# Patient Record
Sex: MALE | Race: BLACK OR AFRICAN AMERICAN | Employment: UNEMPLOYED | URBAN - METROPOLITAN AREA
[De-identification: names, ages, dates, MRNs, and addresses within clinical notes are randomized per-mention and may not be internally consistent; named-entity substitution may affect disease eponyms.]

---

## 2019-01-30 ENCOUNTER — OFFICE VISIT (OUTPATIENT)
Dept: FAMILY MEDICINE CLINIC | Facility: CLINIC | Age: 44
End: 2019-01-30

## 2019-01-30 VITALS
OXYGEN SATURATION: 97 % | TEMPERATURE: 98 F | HEIGHT: 69 IN | RESPIRATION RATE: 16 BRPM | SYSTOLIC BLOOD PRESSURE: 114 MMHG | HEART RATE: 70 BPM | BODY MASS INDEX: 38.06 KG/M2 | WEIGHT: 257 LBS | DIASTOLIC BLOOD PRESSURE: 73 MMHG

## 2019-01-30 DIAGNOSIS — R10.13 EPIGASTRIC DISCOMFORT: Primary | ICD-10-CM

## 2019-01-30 DIAGNOSIS — Z12.5 SCREENING FOR PROSTATE CANCER: ICD-10-CM

## 2019-01-30 DIAGNOSIS — N52.9 ERECTILE DYSFUNCTION, UNSPECIFIED ERECTILE DYSFUNCTION TYPE: ICD-10-CM

## 2019-01-30 DIAGNOSIS — K21.9 GASTROESOPHAGEAL REFLUX DISEASE, ESOPHAGITIS PRESENCE NOT SPECIFIED: ICD-10-CM

## 2019-01-30 DIAGNOSIS — R10.13 EPIGASTRIC DISCOMFORT: ICD-10-CM

## 2019-01-30 DIAGNOSIS — Z13.31 NEGATIVE DEPRESSION SCREENING: ICD-10-CM

## 2019-01-30 PROBLEM — E66.01 SEVERE OBESITY (HCC): Status: ACTIVE | Noted: 2019-01-30

## 2019-01-30 NOTE — PROGRESS NOTES
Nina Escamilla is a 44 y.o.@ presents today for office visit for establish care. Pt is in Room # 5. 
 
 1. Have you been to the ER, urgent care clinic since your last visit? Hospitalized since your last visit?n/a 2. Have you seen or consulted any other health care providers outside of the [address] [place] [address] since your last visit? Include any pap smears or colon screening. n/a 
 
  
PHQ over the last two weeks 1/30/2019 Little interest or pleasure in doing things Not at all Feeling down, depressed, irritable, or hopeless Several days Total Score PHQ 2 1 Health Maintenance reviewed - will update. . 
 
Requested Prescriptions No prescriptions requested or ordered in this encounter Visit Vitals /73 (BP 1 Location: Right arm, BP Patient Position: Sitting) Pulse 70 Temp 98 °F (36.7 °C) (Oral) Resp 16 Ht 5' 9\" (1.753 m) Wt 257 lb (116.6 kg) SpO2 97% BMI 37.95 kg/m²  
 
 
  
Upcoming Appts NO. 
  
VORB: No orders of the defined types were placed in this encounter. 
 MD Layne Parker LPN

## 2019-01-30 NOTE — PROGRESS NOTES
History: Nina Escamilla is a 44 y.o. male presenting today for an initial visit to establish care and c/o acid reflux. Medical history is otherwise unremarkable per patient. HPI: Pt reports a 2 year history of acid reflux described as upset stomach and heartburn. He was taking Prilosec with good response, but symptoms returned once he stopped. Symptoms are most prevalent in the morning on an empty stomach. Food seems to improve symptoms. He reports associated mild epigastric discomfort described as burning and non radiating. He reports one episode of small amount of brbpr 3 months ago with wiping. He denies change in appetite, difficulty swallowing, painful swallowing, vomiting. diarrhea, constipation, or melena. Pt reports a one year history of difficult with obtaining and maintaining erections sufficient for sexual intercourse. He denies decreased libido and reports having occasional morning erections. He is able to achieve No past medical history on file. No past surgical history on file. Social History Socioeconomic History  Marital status:  Spouse name: Not on file  Number of children: Not on file  Years of education: Not on file  Highest education level: Not on file Social Needs  Financial resource strain: Not on file  Food insecurity - worry: Not on file  Food insecurity - inability: Not on file  Transportation needs - medical: Not on file  Transportation needs - non-medical: Not on file Occupational History  Not on file Tobacco Use  Smoking status: Never Smoker  Smokeless tobacco: Never Used Substance and Sexual Activity  Alcohol use: Yes Alcohol/week: 2.4 oz Types: 2 Cans of beer, 2 Glasses of wine per week Comment: 14 beers per week 2 cans per day and 2 cocktails per day  Drug use: Yes Types: Marijuana Comment: pt smokes everyday  Sexual activity: Not on file Other Topics Concern   Service No  
 Blood Transfusions No  
 Caffeine Concern No  
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  
 Sleep Concern No  
 Stress Concern No  
 Weight Concern No  
 Special Diet No  
 Back Care No  
 Exercise No  
 Bike Helmet No  
 Seat Belt Not Asked  Self-Exams Not Asked Social History Narrative  Not on file Family History Problem Relation Age of Onset  Cancer Mother   
     breast,lymph nodes  Heart Attack Mother   
     angina acid reflux  Hypertension Mother  Cancer Father   
     lymph nodes  Hypertension Brother No current outpatient medications on file prior to visit. No current facility-administered medications on file prior to visit. Allergies no known allergies Review of Systems Constitutional: Negative for chills, diaphoresis, fever, malaise/fatigue and weight loss. HENT: Negative for sore throat. Eyes: Negative for blurred vision. Respiratory: Negative for cough and shortness of breath. Cardiovascular: Negative for chest pain and palpitations. Gastrointestinal: Positive for abdominal pain, heartburn and nausea. Negative for blood in stool, constipation, diarrhea, melena and vomiting. Genitourinary: Negative for dysuria, flank pain, frequency, hematuria and urgency. Musculoskeletal: Negative for joint pain and myalgias. Neurological: Negative for dizziness, weakness and headaches. Endo/Heme/Allergies: Does not bruise/bleed easily. Psychiatric/Behavioral: Negative for depression. Objective:  
VS:   
Visit Vitals /73 (BP 1 Location: Right arm, BP Patient Position: Sitting) Pulse 70 Temp 98 °F (36.7 °C) (Oral) Resp 16 Ht 5' 9\" (1.753 m) Wt 257 lb (116.6 kg) SpO2 97% BMI 37.95 kg/m² Physical Exam  
Constitutional: He is oriented to person, place, and time. He appears well-developed and well-nourished. HENT:  
Head: Normocephalic and atraumatic. Mouth/Throat: Oropharynx is clear and moist.  
Eyes: Conjunctivae and EOM are normal. Pupils are equal, round, and reactive to light. Neck: Normal range of motion. Neck supple. Cardiovascular: Normal rate, regular rhythm, normal heart sounds and intact distal pulses. Pulmonary/Chest: Effort normal and breath sounds normal.  
Abdominal: Soft. Bowel sounds are normal. He exhibits no distension and no mass. There is no tenderness. No hernia. Musculoskeletal: Normal range of motion. He exhibits no edema or deformity. Lymphadenopathy:  
  He has no cervical adenopathy. Neurological: He is alert and oriented to person, place, and time. Skin: Skin is warm and dry. Capillary refill takes less than 2 seconds. Psychiatric: He has a normal mood and affect. His behavior is normal.  
Nursing note and vitals reviewed. Assessment/ Plan:  
 
Diagnoses and all orders for this visit: 1. Epigastric discomfort 
-     CBC WITH AUTOMATED DIFF; Future -     LIPASE; Future -     AMYLASE; Future -     METABOLIC PANEL, COMPREHENSIVE; Future 
-     H. PYLORI BREATH TEST; Future 2. Gastroesophageal reflux disease, esophagitis presence not specified -     Will resume Prilosec daily for now. 3. Screening for prostate cancer -     PSA SCREENING (SCREENING); Future 4. Erectile dysfunction, unspecified erectile dysfunction type -     TESTOSTERONE, FREE & TOTAL; Future 5. Negative depression screening -     [id number] [address] I have discussed the diagnosis with the patient and the intended plan as seen in the above orders. The patient verbalized understanding and agrees with the plan. Follow-up Disposition: Not on File Ghazala Walsh MD

## 2019-02-03 LAB
% FREE TESTOSTERONE: 1.47 %
A-G RATIO,AGRAT: 2.4 RATIO (ref 1.1–2.6)
ABSOLUTE LYMPHOCYTE COUNT, 10803: 3 K/UL (ref 1–4.8)
ALBUMIN SERPL-MCNC: 4.7 G/DL (ref 3.5–5)
ALP SERPL-CCNC: 87 U/L (ref 25–115)
ALT SERPL-CCNC: 29 U/L (ref 5–40)
AMYLASE SERPL-CCNC: 41 U/L (ref 20–120)
ANION GAP SERPL CALC-SCNC: 16 MMOL/L
AST SERPL W P-5'-P-CCNC: 29 U/L (ref 10–37)
BASOPHILS # BLD: 0 K/UL (ref 0–0.2)
BASOPHILS NFR BLD: 1 % (ref 0–2)
BILIRUB SERPL-MCNC: 0.5 MG/DL (ref 0.2–1.2)
BUN SERPL-MCNC: 14 MG/DL (ref 6–22)
CALCIUM SERPL-MCNC: 9 MG/DL (ref 8.4–10.4)
CHLORIDE SERPL-SCNC: 104 MMOL/L (ref 98–110)
CO2 SERPL-SCNC: 25 MMOL/L (ref 20–32)
CREAT SERPL-MCNC: 0.9 MG/DL (ref 0.5–1.2)
EOSINOPHIL # BLD: 0.2 K/UL (ref 0–0.5)
EOSINOPHIL NFR BLD: 3 % (ref 0–6)
ERYTHROCYTE [DISTWIDTH] IN BLOOD BY AUTOMATED COUNT: 14.1 % (ref 10–15.5)
FREE TESTOSTERONE,DIRECT, 144981: 6.31 NG/DL
GFRAA, 66117: >60
GFRNA, 66118: >60
GLOBULIN,GLOB: 2 G/DL (ref 2–4)
GLUCOSE SERPL-MCNC: 85 MG/DL (ref 70–99)
GRANULOCYTES,GRANS: 49 % (ref 40–75)
HCT VFR BLD AUTO: 47 % (ref 36.6–51.9)
HGB BLD-MCNC: 14.9 G/DL (ref 13.2–17.3)
LIPASE SERPL-CCNC: 31 U/L (ref 7–60)
LYMPHOCYTES, LYMLT: 44 % (ref 20–45)
MCH RBC QN AUTO: 32 PG (ref 26–34)
MCHC RBC AUTO-ENTMCNC: 32 G/DL (ref 31–36)
MCV RBC AUTO: 99 FL (ref 80–95)
MONOCYTES # BLD: 0.3 K/UL (ref 0.1–1)
MONOCYTES NFR BLD: 4 % (ref 3–12)
NEUTROPHILS # BLD AUTO: 3.3 K/UL (ref 1.8–7.7)
PLATELET # BLD AUTO: 224 K/UL (ref 140–440)
PMV BLD AUTO: 11 FL (ref 9–13)
POTASSIUM SERPL-SCNC: 4.4 MMOL/L (ref 3.5–5.5)
PROT SERPL-MCNC: 6.7 G/DL (ref 6.4–8.3)
PSA SERPL-MCNC: 0.19 NG/ML
RBC # BLD AUTO: 4.73 M/UL (ref 3.8–5.8)
SODIUM SERPL-SCNC: 145 MMOL/L (ref 133–145)
TESTOSTERONE: 429 NG/DL
WBC # BLD AUTO: 6.8 K/UL (ref 4–11)

## 2019-02-04 ENCOUNTER — HOSPITAL ENCOUNTER (OUTPATIENT)
Dept: LAB | Age: 44
Discharge: HOME | End: 2019-02-04
Payer: SELF-PAY

## 2019-02-04 PROCEDURE — 82274 ASSAY TEST FOR BLOOD FECAL: CPT

## 2019-02-05 DIAGNOSIS — R10.13 EPIGASTRIC DISCOMFORT: Primary | ICD-10-CM

## 2019-02-06 ENCOUNTER — OFFICE VISIT (OUTPATIENT)
Dept: FAMILY MEDICINE CLINIC | Facility: CLINIC | Age: 44
End: 2019-02-06

## 2019-02-06 VITALS
HEIGHT: 69 IN | WEIGHT: 257 LBS | DIASTOLIC BLOOD PRESSURE: 72 MMHG | HEART RATE: 69 BPM | TEMPERATURE: 97.8 F | RESPIRATION RATE: 18 BRPM | OXYGEN SATURATION: 98 % | BODY MASS INDEX: 38.06 KG/M2 | SYSTOLIC BLOOD PRESSURE: 115 MMHG

## 2019-02-06 DIAGNOSIS — R10.13 EPIGASTRIC DISCOMFORT: ICD-10-CM

## 2019-02-06 DIAGNOSIS — N52.9 ERECTILE DYSFUNCTION, UNSPECIFIED ERECTILE DYSFUNCTION TYPE: ICD-10-CM

## 2019-02-06 DIAGNOSIS — K21.9 GASTROESOPHAGEAL REFLUX DISEASE, ESOPHAGITIS PRESENCE NOT SPECIFIED: Primary | ICD-10-CM

## 2019-02-06 LAB — HEMOCCULT STL QL IA: NEGATIVE

## 2019-02-06 RX ORDER — PHENOL/SODIUM PHENOLATE
20 AEROSOL, SPRAY (ML) MUCOUS MEMBRANE DAILY
Qty: 30 TAB | Refills: 1 | Status: SHIPPED | OUTPATIENT
Start: 2019-02-06

## 2019-02-06 NOTE — PROGRESS NOTES
Esteban Schneider is a 44 y.o.@ presents today for office visit for follow up. Pt is in Room # 5.     1. Have you been to the ER, urgent care clinic since your last visit? Hospitalized since your last visit? No    2. Have you seen or consulted any other health care providers outside of the [address] [place] [address] since your last visit? Include any pap smears or colon screening. No         Health Maintenance reviewed - updated.  .    Requested Prescriptions      No prescriptions requested or ordered in this encounter       Visit Vitals  /72 (BP 1 Location: Right arm, BP Patient Position: Sitting)   Pulse 69   Temp 97.8 °F (36.6 °C) (Oral)   Resp 18   Ht 5' 9\" (1.753 m)   Wt 257 lb (116.6 kg)   SpO2 98%   BMI 37.95 kg/m²          Upcoming Appts  No.     VORB: No orders of the defined types were placed in this encounter.   Arely Julien MD Tina Session, [facility]

## 2019-02-11 NOTE — PROGRESS NOTES
Subjective: Trudy Raymond is a 44 y.o.  male who presents for follow up of GERD. Pt reports a 2 year history of acid reflux described as upset stomach and heartburn. He was taking Prilosec with good response, but symptoms returned once he stopped. Symptoms are most prevalent in the morning on an empty stomach. Food seems to improve symptoms. He reports associated mild epigastric discomfort described as burning and non radiating. He reports one episode of small amount of brbpr 3 months ago with wiping. He denies change in appetite, difficulty swallowing, painful swallowing, vomiting. diarrhea, constipation, or melena. Pt reports resolution in symptoms with ppi.         Erectile dysfunction: Pt reports a one year history of difficult with obtaining and maintaining erections sufficient for sexual intercourse. He denies decreased libido and reports having occasional morning erections. Testosterone level was 429, free testosterone 6.31 on 1/31/19. He reports mild improvement since his last visit.        Review of Systems   Constitutional: Negative for chills, diaphoresis, fever, malaise/fatigue and weight loss. Respiratory: Negative for cough and shortness of breath. Cardiovascular: Negative for chest pain and palpitations. Gastrointestinal: Positive for heartburn. Negative for abdominal pain, blood in stool, constipation, diarrhea, melena, nausea and vomiting. Genitourinary: Negative for dysuria, flank pain, frequency, hematuria and urgency. Musculoskeletal: Negative for myalgias. Neurological: Negative for dizziness, weakness and headaches. Psychiatric/Behavioral: Negative for depression. No current outpatient medications on file prior to visit. No current facility-administered medications on file prior to visit. Reviewed PmHx, RxHx, FmHx, SocHx, AllgHx and updated and dated in the chart.     Nurse notes were reviewed and are correct    Objective:     Vitals:    02/06/19 1546   BP: 115/72   Pulse: 69   Resp: 18   Temp: 97.8 °F (36.6 °C)   TempSrc: Oral   SpO2: 98%   Weight: 257 lb (116.6 kg)   Height: 5' 9\" (1.753 m)     Physical Exam   Constitutional: He is oriented to person, place, and time. He appears well-developed and well-nourished. HENT:   Head: Normocephalic and atraumatic. Mouth/Throat: Oropharynx is clear and moist.   Eyes: Conjunctivae and EOM are normal. Pupils are equal, round, and reactive to light. Neck: Normal range of motion. Neck supple. Cardiovascular: Normal rate, regular rhythm, normal heart sounds and intact distal pulses. Pulmonary/Chest: Effort normal and breath sounds normal.   Abdominal: Soft. Bowel sounds are normal. He exhibits no distension and no mass. There is no tenderness. No hernia. Musculoskeletal: Normal range of motion. He exhibits no edema or deformity. Neurological: He is alert and oriented to person, place, and time. Skin: Skin is warm and dry. Capillary refill takes less than 2 seconds. Psychiatric: He has a normal mood and affect. His behavior is normal.   Nursing note and vitals reviewed. Assessment/ Plan:     Diagnoses and all orders for this visit:    1. Gastroesophageal reflux disease, esophagitis presence not specified  -     Omeprazole delayed release (PRILOSEC D/R) 20 mg tablet; Take 1 Tab by mouth daily. 2. Epigastric discomfort        -     No further symptoms. Continue Prilosec. Will continue to monitor. 3. Erectile dysfunction, unspecified erectile dysfunction type        -     Pt reporting improvement since last visit. Will continue fo monitor. Testosterone and free testosterone wnl. % free testosterone is borderline low. Will repeat morning testosterone at a later date. 4. BMI 37.0-37.9, adult       -   Counseled on lifestyle modification with health diet and regular exercise. I have discussed the diagnosis with the patient and the intended plan as seen in the above orders.   The patient verbalized understanding and agrees with the plan. Follow-up Disposition:  Return in about 1 month (around 3/6/2019) for follow-up of GERD.     Erik Harvey MD